# Patient Record
Sex: MALE | Race: OTHER
[De-identification: names, ages, dates, MRNs, and addresses within clinical notes are randomized per-mention and may not be internally consistent; named-entity substitution may affect disease eponyms.]

---

## 2021-02-20 ENCOUNTER — HOSPITAL ENCOUNTER (EMERGENCY)
Age: 36
Discharge: ANOTHER HOSPITAL | End: 2021-02-20
Payer: COMMERCIAL

## 2021-02-20 VITALS
SYSTOLIC BLOOD PRESSURE: 112 MMHG | OXYGEN SATURATION: 100 % | DIASTOLIC BLOOD PRESSURE: 68 MMHG | HEART RATE: 86 BPM | RESPIRATION RATE: 25 BRPM

## 2021-02-20 VITALS
RESPIRATION RATE: 18 BRPM | HEART RATE: 80 BPM | SYSTOLIC BLOOD PRESSURE: 116 MMHG | OXYGEN SATURATION: 100 % | DIASTOLIC BLOOD PRESSURE: 67 MMHG

## 2021-02-20 VITALS — BODY MASS INDEX: 28.4 KG/M2

## 2021-02-20 VITALS
RESPIRATION RATE: 17 BRPM | OXYGEN SATURATION: 99 % | SYSTOLIC BLOOD PRESSURE: 167 MMHG | TEMPERATURE: 97.3 F | DIASTOLIC BLOOD PRESSURE: 72 MMHG | HEART RATE: 119 BPM

## 2021-02-20 VITALS
HEART RATE: 81 BPM | DIASTOLIC BLOOD PRESSURE: 67 MMHG | SYSTOLIC BLOOD PRESSURE: 124 MMHG | RESPIRATION RATE: 17 BRPM | OXYGEN SATURATION: 100 %

## 2021-02-20 VITALS
RESPIRATION RATE: 27 BRPM | HEART RATE: 87 BPM | SYSTOLIC BLOOD PRESSURE: 114 MMHG | OXYGEN SATURATION: 96 % | DIASTOLIC BLOOD PRESSURE: 68 MMHG

## 2021-02-20 VITALS — DIASTOLIC BLOOD PRESSURE: 59 MMHG | HEART RATE: 95 BPM | RESPIRATION RATE: 22 BRPM | SYSTOLIC BLOOD PRESSURE: 128 MMHG

## 2021-02-20 DIAGNOSIS — G93.9: ICD-10-CM

## 2021-02-20 DIAGNOSIS — R56.9: Primary | ICD-10-CM

## 2021-02-20 LAB
AMPHET UR-MCNC: NEGATIVE NG/ML
ANION GAP: 17 (ref 5–15)
BARBITURATE URINE VISTA: NEGATIVE
BENZODIAZEPINE URINE VISTA: NEGATIVE
BUN SERPL-MCNC: 32 MG/DL (ref 7–18)
BUN/CREAT RATIO: 21.9 RATIO (ref 10–20)
CALCIUM SERPL-MCNC: 8.5 MG/DL (ref 8.5–10.1)
CARBON DIOXIDE: 17 MMOL/L (ref 21–32)
CHLORIDE: 106 MMOL/L (ref 98–107)
COCAINE URINE VISTA: NEGATIVE
DACRYOCYTES BLD QL SMEAR: (no result)
DEPRECATED RDW RBC: 34.2 FL (ref 35.1–43.9)
DIFFERENTIAL COMMENT: (no result)
DIFFERENTIAL INDICATED: (no result)
DRUG CONFIRMATION TO FOLLOW?: (no result)
ECSTACY URINE VISTA: NEGATIVE
ERYTHROCYTE [DISTWIDTH] IN BLOOD: 17.1 % (ref 11.6–14.6)
EST GLOM FILT RATE - AFR AMER: 71 ML/MIN (ref 60–?)
ESTIMATED CREATININE CLEARANCE: 73.61 ML/MIN
GLUCOSE: 78 MG/DL (ref 74–106)
HCT VFR BLD AUTO: 42.7 % (ref 40–54)
HEMOGLOBIN: 12.9 G/DL (ref 13–16.5)
HGB BLD-MCNC: 12.9 G/DL (ref 13–16.5)
IMMATURE GRANULOCYTES COUNT: 0.06 X10^3/UL (ref 0–0)
KETONE-DIPSTICK: 5 MG/DL
MANUAL DIF COMMENT BLD-IMP: (no result)
MCV RBC: 64.5 FL (ref 80–94)
MEAN CORP HGB CONC: 30.2 G/DL (ref 32–36)
MEAN PLATELET VOL.: 10.7 FL (ref 6.2–12)
METHADONE URINE VISTA: NEGATIVE
MUCOUS THREADS URNS QL MICRO: (no result) /HPF
NRBC FLAGGED BY ANALYZER: 0 % (ref 0–5)
PCP UR QL: NEGATIVE
PH UR: 6 [PH]
PLATELET # BLD: 232 K/MM3 (ref 150–450)
PLATELET COUNT: 232 K/MM3 (ref 150–450)
POSITIVE DIFFERENTIAL: YES
POSITIVE MORPHOLOGY: YES
POTASSIUM: 3.6 MMOL/L (ref 3.5–5.1)
PROT UR QL STRIP.AUTO: 30 MG/DL
RBC # BLD AUTO: 6.62 M/MM3 (ref 4.6–6.2)
RBC DISTRIBUTION WIDTH CV: 17.1 % (ref 11.6–14.6)
RBC DISTRIBUTION WIDTH SD: 34.2 FL (ref 35.1–43.9)
RBC UR QL: (no result) /HPF (ref 0–5)
RBC UR QL: 250 /UL
SCAN SMEAR PER REVIEW CRITERIA: (no result)
SCHISTOCYTES BLD QL SMEAR: (no result)
SCHISTOCYTES: (no result)
SP GR UR: 1.02 (ref 1–1.03)
SQUAMOUS URNS QL MICRO: (no result) /HPF (ref 0–5)
TEAR DROP CELL: (no result)
THC URINE VISTA: NEGATIVE
URINE PRESERVATIVE: (no result)
WBC # BLD AUTO: 11.2 K/MM3 (ref 4.4–11)
WHITE BLOOD COUNT: 11.2 K/MM3 (ref 4.4–11)

## 2021-02-20 PROCEDURE — 83605 ASSAY OF LACTIC ACID: CPT

## 2021-02-20 PROCEDURE — 85025 COMPLETE CBC W/AUTO DIFF WBC: CPT

## 2021-02-20 PROCEDURE — 81001 URINALYSIS AUTO W/SCOPE: CPT

## 2021-02-20 PROCEDURE — 87426 SARSCOV CORONAVIRUS AG IA: CPT

## 2021-02-20 PROCEDURE — 84484 ASSAY OF TROPONIN QUANT: CPT

## 2021-02-20 PROCEDURE — 96374 THER/PROPH/DIAG INJ IV PUSH: CPT

## 2021-02-20 PROCEDURE — A4216 STERILE WATER/SALINE, 10 ML: HCPCS

## 2021-02-20 PROCEDURE — 96375 TX/PRO/DX INJ NEW DRUG ADDON: CPT

## 2021-02-20 PROCEDURE — 80048 BASIC METABOLIC PNL TOTAL CA: CPT

## 2021-02-20 PROCEDURE — 99285 EMERGENCY DEPT VISIT HI MDM: CPT

## 2021-02-20 PROCEDURE — 70450 CT HEAD/BRAIN W/O DYE: CPT

## 2021-02-20 PROCEDURE — 80307 DRUG TEST PRSMV CHEM ANLYZR: CPT

## 2021-04-06 ENCOUNTER — HOSPITAL ENCOUNTER (EMERGENCY)
Age: 36
Discharge: HOME | End: 2021-04-06
Payer: COMMERCIAL

## 2021-04-06 VITALS
OXYGEN SATURATION: 100 % | SYSTOLIC BLOOD PRESSURE: 135 MMHG | DIASTOLIC BLOOD PRESSURE: 72 MMHG | HEART RATE: 91 BPM | RESPIRATION RATE: 21 BRPM

## 2021-04-06 VITALS
DIASTOLIC BLOOD PRESSURE: 76 MMHG | HEART RATE: 71 BPM | RESPIRATION RATE: 16 BRPM | SYSTOLIC BLOOD PRESSURE: 135 MMHG | OXYGEN SATURATION: 96 %

## 2021-04-06 VITALS — BODY MASS INDEX: 28.4 KG/M2 | BODY MASS INDEX: 27.2 KG/M2

## 2021-04-06 VITALS
DIASTOLIC BLOOD PRESSURE: 54 MMHG | SYSTOLIC BLOOD PRESSURE: 116 MMHG | HEART RATE: 113 BPM | TEMPERATURE: 97.52 F | OXYGEN SATURATION: 99 % | RESPIRATION RATE: 18 BRPM

## 2021-04-06 DIAGNOSIS — R56.9: Primary | ICD-10-CM

## 2021-04-06 LAB
ANION GAP: 7 (ref 5–15)
BUN SERPL-MCNC: 30 MG/DL (ref 7–18)
BUN/CREAT RATIO: 21.4 RATIO (ref 10–20)
CALCIUM SERPL-MCNC: 8.7 MG/DL (ref 8.5–10.1)
CARBON DIOXIDE: 25 MMOL/L (ref 21–32)
CHLORIDE: 106 MMOL/L (ref 98–107)
DEPRECATED RDW RBC: 34.9 FL (ref 35.1–43.9)
ERYTHROCYTE [DISTWIDTH] IN BLOOD: 17.1 % (ref 11.6–14.6)
EST GLOM FILT RATE - AFR AMER: 74 ML/MIN (ref 60–?)
ESTIMATED CREATININE CLEARANCE: 76.04 ML/MIN
GLUCOSE: 110 MG/DL (ref 74–106)
HCT VFR BLD AUTO: 39.4 % (ref 40–54)
HEMOGLOBIN: 12.1 G/DL (ref 13–16.5)
HGB BLD-MCNC: 12.1 G/DL (ref 13–16.5)
IMMATURE GRANULOCYTES COUNT: 0.02 X10^3/UL (ref 0–0)
MCV RBC: 63.2 FL (ref 80–94)
MEAN CORP HGB CONC: 30.7 G/DL (ref 32–36)
MEAN PLATELET VOL.: 10.5 FL (ref 6.2–12)
NRBC FLAGGED BY ANALYZER: 0 % (ref 0–5)
PLATELET # BLD: 211 K/MM3 (ref 150–450)
PLATELET COUNT: 211 K/MM3 (ref 150–450)
POTASSIUM: 3.9 MMOL/L (ref 3.5–5.1)
RBC # BLD AUTO: 6.23 M/MM3 (ref 4.6–6.2)
RBC DISTRIBUTION WIDTH CV: 17.1 % (ref 11.6–14.6)
RBC DISTRIBUTION WIDTH SD: 34.9 FL (ref 35.1–43.9)
WBC # BLD AUTO: 6.6 K/MM3 (ref 4.4–11)
WHITE BLOOD COUNT: 6.6 K/MM3 (ref 4.4–11)

## 2021-04-06 PROCEDURE — 85025 COMPLETE CBC W/AUTO DIFF WBC: CPT

## 2021-04-06 PROCEDURE — 80048 BASIC METABOLIC PNL TOTAL CA: CPT

## 2021-04-06 PROCEDURE — A4216 STERILE WATER/SALINE, 10 ML: HCPCS

## 2021-04-06 PROCEDURE — 99283 EMERGENCY DEPT VISIT LOW MDM: CPT

## 2021-04-06 PROCEDURE — 70450 CT HEAD/BRAIN W/O DYE: CPT

## 2021-04-27 ENCOUNTER — HOSPITAL ENCOUNTER (OUTPATIENT)
Age: 36
End: 2021-04-27
Payer: COMMERCIAL

## 2021-04-27 VITALS — BODY MASS INDEX: 27.2 KG/M2

## 2021-04-27 DIAGNOSIS — D32.9: Primary | ICD-10-CM

## 2021-04-27 PROCEDURE — 70450 CT HEAD/BRAIN W/O DYE: CPT

## 2024-04-16 DIAGNOSIS — D32.9 MENINGIOMA (MULTI): Primary | ICD-10-CM

## 2024-05-16 ENCOUNTER — HOSPITAL ENCOUNTER (OUTPATIENT)
Dept: RADIOLOGY | Facility: CLINIC | Age: 39
Discharge: HOME | End: 2024-05-16
Payer: COMMERCIAL

## 2024-05-16 DIAGNOSIS — D32.9 MENINGIOMA (MULTI): ICD-10-CM

## 2024-05-16 PROCEDURE — A9575 INJ GADOTERATE MEGLUMI 0.1ML: HCPCS | Performed by: NURSE PRACTITIONER

## 2024-05-16 PROCEDURE — 70553 MRI BRAIN STEM W/O & W/DYE: CPT | Performed by: RADIOLOGY

## 2024-05-16 PROCEDURE — 2550000001 HC RX 255 CONTRASTS: Performed by: NURSE PRACTITIONER

## 2024-05-16 PROCEDURE — 70553 MRI BRAIN STEM W/O & W/DYE: CPT

## 2024-05-16 RX ORDER — GADOTERATE MEGLUMINE 376.9 MG/ML
0.2 INJECTION INTRAVENOUS
Status: COMPLETED | OUTPATIENT
Start: 2024-05-16 | End: 2024-05-16

## 2024-05-16 RX ADMIN — GADOTERATE MEGLUMINE 17 ML: 376.9 INJECTION INTRAVENOUS at 15:40

## 2024-05-17 NOTE — RESULT ENCOUNTER NOTE
Imaging:   MRI brain w/wo 05/16/24 with continued post surgical changes form R frontoparietal craniotomy for resection of meningioma in 2021. No evidence of recurrence or residual tumor.       Follow Up:  Continue with post op imaging surveillance with repeat MRI w/wo in 1 year.

## 2025-04-25 DIAGNOSIS — D32.9 MENINGIOMA (MULTI): Primary | ICD-10-CM

## 2025-05-22 ENCOUNTER — APPOINTMENT (OUTPATIENT)
Dept: RADIOLOGY | Facility: CLINIC | Age: 40
End: 2025-05-22
Payer: COMMERCIAL

## 2025-05-22 DIAGNOSIS — D32.9 MENINGIOMA (MULTI): ICD-10-CM

## 2025-05-22 PROCEDURE — 2550000001 HC RX 255 CONTRASTS: Mod: JZ | Performed by: NURSE PRACTITIONER

## 2025-05-22 PROCEDURE — A9575 INJ GADOTERATE MEGLUMI 0.1ML: HCPCS | Mod: JZ | Performed by: NURSE PRACTITIONER

## 2025-05-22 PROCEDURE — 70553 MRI BRAIN STEM W/O & W/DYE: CPT

## 2025-05-22 RX ORDER — GADOTERATE MEGLUMINE 376.9 MG/ML
0.2 INJECTION INTRAVENOUS
Status: COMPLETED | OUTPATIENT
Start: 2025-05-22 | End: 2025-05-22

## 2025-05-22 RX ADMIN — GADOTERATE MEGLUMINE 17 ML: 376.9 INJECTION INTRAVENOUS at 14:26
